# Patient Record
Sex: MALE | Race: BLACK OR AFRICAN AMERICAN | Employment: FULL TIME | URBAN - METROPOLITAN AREA
[De-identification: names, ages, dates, MRNs, and addresses within clinical notes are randomized per-mention and may not be internally consistent; named-entity substitution may affect disease eponyms.]

---

## 2017-04-29 ENCOUNTER — APPOINTMENT (OUTPATIENT)
Dept: GENERAL RADIOLOGY | Age: 37
End: 2017-04-29
Attending: EMERGENCY MEDICINE
Payer: COMMERCIAL

## 2017-04-29 ENCOUNTER — HOSPITAL ENCOUNTER (EMERGENCY)
Age: 37
Discharge: HOME | End: 2017-04-29
Attending: EMERGENCY MEDICINE
Payer: COMMERCIAL

## 2017-04-29 VITALS
BODY MASS INDEX: 29.8 KG/M2 | RESPIRATION RATE: 16 BRPM | WEIGHT: 220 LBS | SYSTOLIC BLOOD PRESSURE: 130 MMHG | TEMPERATURE: 99.2 F | HEART RATE: 71 BPM | DIASTOLIC BLOOD PRESSURE: 96 MMHG | OXYGEN SATURATION: 95 % | HEIGHT: 72 IN

## 2017-04-29 DIAGNOSIS — S93.402A SPRAIN OF LEFT ANKLE, UNSPECIFIED LIGAMENT, INITIAL ENCOUNTER: Primary | ICD-10-CM

## 2017-04-29 PROCEDURE — 74011250636 HC RX REV CODE- 250/636: Performed by: EMERGENCY MEDICINE

## 2017-04-29 PROCEDURE — L4350 ANKLE CONTROL ORTHO PRE OTS: HCPCS

## 2017-04-29 PROCEDURE — 73610 X-RAY EXAM OF ANKLE: CPT

## 2017-04-29 PROCEDURE — 99283 EMERGENCY DEPT VISIT LOW MDM: CPT

## 2017-04-29 PROCEDURE — 73590 X-RAY EXAM OF LOWER LEG: CPT

## 2017-04-29 PROCEDURE — 96372 THER/PROPH/DIAG INJ SC/IM: CPT

## 2017-04-29 RX ORDER — KETOROLAC TROMETHAMINE 30 MG/ML
60 INJECTION, SOLUTION INTRAMUSCULAR; INTRAVENOUS
Status: COMPLETED | OUTPATIENT
Start: 2017-04-29 | End: 2017-04-29

## 2017-04-29 RX ORDER — KETOROLAC TROMETHAMINE 10 MG/1
10 TABLET, FILM COATED ORAL EVERY 8 HOURS
Qty: 15 TAB | Refills: 0 | Status: SHIPPED | OUTPATIENT
Start: 2017-04-29 | End: 2017-05-04

## 2017-04-29 RX ADMIN — KETOROLAC TROMETHAMINE 60 MG: 30 INJECTION, SOLUTION INTRAMUSCULAR at 03:57

## 2017-04-29 NOTE — ED TRIAGE NOTES
Pt injured left lower leg sliding while playing baseball last night. Pain becoming worse. Pt reports he took aleve prior to coming in.

## 2017-04-29 NOTE — ED NOTES
Pt was discharged in good and improved condition. The patient's diagnosis, condition and treatment were explained to patient and aftercare instructions were given. The patient verbalized good understanding. Patient armband removed and both labels and armband were placed in shred bin. Patient left ER ambulatory with steady gait in no acute distress. 1 prescriptions provided. Patient reports pain is currently 4 on numeric scale. Patient provided education about pain medication including dosage and side effects. Patient verbalized understanding. Ride spouse at bedside to provide transportation home.

## 2017-04-29 NOTE — ED PROVIDER NOTES
HPI Comments: 3:48 AM   Simba Duran is a 29 y.o. male presenting to the ED C/O gradually worsening  left lower leg onset 9 hours ago when pt slid when playing softball. Pt states he took Aleve PTA. Pt is unable to ambulate due to pain. Pt denies ankle swelling  and any other Sx or complaints. Patient is a 29 y.o. male presenting with leg pain. The history is provided by the patient. No  was used. Leg Pain    This is a new problem. The current episode started 6 to 12 hours ago (9 hours ago). The problem occurs constantly. The problem has been gradually worsening. The pain is present in the left lower leg. The pain is at a severity of 10/10. The symptoms are aggravated by contact. Written by JOANNA Granados, as dictated by Boston Pearl MD    History reviewed. No pertinent past medical history. History reviewed. No pertinent surgical history. History reviewed. No pertinent family history. Social History     Social History    Marital status:      Spouse name: N/A    Number of children: N/A    Years of education: N/A     Occupational History    Not on file. Social History Main Topics    Smoking status: Not on file    Smokeless tobacco: Not on file    Alcohol use Not on file    Drug use: Not on file    Sexual activity: Not on file     Other Topics Concern    Not on file     Social History Narrative    No narrative on file         ALLERGIES: Review of patient's allergies indicates no known allergies. Review of Systems   Musculoskeletal: Positive for myalgias (Left lower leg). Negative for joint swelling. All other systems reviewed and are negative. Vitals:    04/29/17 0344   BP: (!) 130/96   Pulse: 71   Resp: 16   Temp: 99.2 °F (37.3 °C)   SpO2: 95%   Weight: 99.8 kg (220 lb)   Height: 6' (1.829 m)            Physical Exam   Constitutional: He is oriented to person, place, and time. He appears well-developed and well-nourished. HENT:   Head: Normocephalic and atraumatic. Eyes: Pupils are equal, round, and reactive to light. Neck: Neck supple. Cardiovascular: Normal rate, regular rhythm, S1 normal, S2 normal and normal heart sounds. Pulmonary/Chest: Breath sounds normal. No respiratory distress. He has no wheezes. He has no rales. He exhibits no tenderness. Abdominal: Soft. He exhibits no distension and no mass. There is no tenderness. There is no guarding. Musculoskeletal: Normal range of motion. He exhibits no edema. Left knee: He exhibits normal range of motion (Full ROM) and no swelling. Tenderness (Minimal tenderness) found. Left ankle: Tenderness (Diffusely tender). Left lower leg: He exhibits tenderness (Tenderness to left calf). Increased pain with ROM of left ankle    Neurological: He is alert and oriented to person, place, and time. No cranial nerve deficit. Left lower extremity neurovascularly intact   Skin: No rash noted. Psychiatric: He has a normal mood and affect. His behavior is normal. Thought content normal.   Nursing note and vitals reviewed. RESULTS:      PULSE OXIMETRY NOTE:  Pulse-ox is 95% on RA  Interpretation: Normal       X-RAY FINDINGS:  4:38 AM  Left tib/fib x-ray shows no acute process  Pending review by Radiologist  Recorded by Minus Michele, ED Scribe, as dictated by Homer Stevens MD   X-RAY FINDINGS:  4:38 AM  Left ankle x-ray shows no acute process  Pending review by Radiologist  Recorded by Minus Michele, ED Scribe, as dictated by Homer Stevens MD     XR ANKLE LT MIN 3 V    (Results Pending)   XR TIB/FIB LT    (Results Pending)        Labs Reviewed - No data to display    No results found for this or any previous visit (from the past 12 hour(s)).     MDM  Number of Diagnoses or Management Options  Sprain of left ankle, unspecified ligament, initial encounter:      Amount and/or Complexity of Data Reviewed  Tests in the radiology section of CPT®: reviewed and ordered (Left ankle x-ray, Left tib/fib x-ray)  Independent visualization of images, tracings, or specimens: yes (Left ankle x-ray, Left tib/fib x-ray)      ED Course     Medications   ketorolac tromethamine (TORADOL) 60 mg/2 mL injection 60 mg (60 mg IntraMUSCular Given 4/29/17 0357)      Procedures  PROGRESS NOTE:  3:48 AM  Initial assessment performed. Written by Jd Dorsey ED Scribe, as dictated by Boston Pearl MD     DISCHARGE NOTE:  4:47 AM   Rhonda Gleason  results have been reviewed with him. He has been counseled regarding his diagnosis, treatment, and plan. He verbally conveys understanding and agreement of the signs, symptoms, diagnosis, treatment and prognosis and additionally agrees to follow up as discussed. He also agrees with the care-plan and conveys that all of his questions have been answered. I have also provided discharge instructions for him that include: educational information regarding their diagnosis and treatment, and list of reasons why they would want to return to the ED prior to their follow-up appointment, should his condition change. The patient and/or family has been provided with education for proper Emergency Department utilization. CLINICAL IMPRESSION:    1. Sprain of left ankle, unspecified ligament, initial encounter        PLAN: DISCHARGE HOME    Follow-up Information     Follow up With Details Comments Richard Palma MD Schedule an appointment as soon as possible for a visit in 2 days  [phone] [address] EMERGENCY DEPT Go to As needed, If symptoms worsen [address] [place] Farley  [phone]          Current Discharge Medication List      START taking these medications    Details   ketorolac (TORADOL) 10 mg tablet Take 1 Tab by mouth every eight (8) hours for 5 days.   Qty: 15 Tab, Refills: 0             ATTESTATIONS:  This note is prepared by Ana Rosa Sanchez Keyur Burr, acting as Scribe for [employer], MD .    [employer], MD : The scribe's documentation has been prepared under my direction and personally reviewed by me in its entirety. I confirm that the note above accurately reflects all work, treatment, procedures, and medical decision making performed by me.

## 2017-04-29 NOTE — DISCHARGE INSTRUCTIONS
Ankle Sprain: Care Instructions  Your Care Instructions    An ankle sprain can happen when you twist your ankle. The ligaments that support the ankle can get stretched and torn. Often the ankle is swollen and painful. Ankle sprains may take from several weeks to several months to heal. Usually, the more pain and swelling you have, the more severe your ankle sprain is and the longer it will take to heal. You can heal faster and regain strength in your ankle with good home treatment. It is very important to give your ankle time to heal completely, so that you do not easily hurt your ankle again. Follow-up care is a key part of your treatment and safety. Be sure to make and go to all appointments, and call your doctor if you are having problems. It's also a good idea to know your test results and keep a list of the medicines you take. How can you care for yourself at home? · Prop up your foot on pillows as much as possible for the next 3 days. Try to keep your ankle above the level of your heart. This will help reduce the swelling. · Follow your doctor's directions for wearing a splint or elastic bandage. Wrapping the ankle may help reduce or prevent swelling. · Your doctor may give you a splint, a brace, an air stirrup, or another form of ankle support to protect your ankle until it is healed. Wear it as directed while your ankle is healing. Do not remove it unless your doctor tells you to. After your ankle has healed, ask your doctor whether you should wear the brace when you exercise. · Put ice or cold packs on your injured ankle for 10 to 20 minutes at a time. Try to do this every 1 to 2 hours for the next 3 days (when you are awake) or until the swelling goes down. Put a thin cloth between the ice and your skin. · You may need to use crutches until you can walk without pain. If you do use crutches, try to bear some weight on your injured ankle if you can do so without pain.  This helps the ankle heal.  · Take pain medicines exactly as directed. ¨ If the doctor gave you a prescription medicine for pain, take it as prescribed. ¨ If you are not taking a prescription pain medicine, ask your doctor if you can take an over-the-counter medicine. · If you have been given ankle exercises to do at home, do them exactly as instructed. These can promote healing and help prevent lasting weakness. When should you call for help? Call your doctor now or seek immediate medical care if:  · Your pain is getting worse. · Your swelling is getting worse. · Your splint feels too tight or you are unable to loosen it. Watch closely for changes in your health, and be sure to contact your doctor if:  · You are not getting better after 1 week. Where can you learn more? Go to http://katey-devan.info/. Enter C830 in the search box to learn more about \"Ankle Sprain: Care Instructions. \"  Current as of: May 23, 2016  Content Version: 11.2  © 3818-1979 [employer]. Care instructions adapted under license by [employer] (which disclaims liability or warranty for this information). If you have questions about a medical condition or this instruction, always ask your healthcare professional. [place] [address] any warranty or liability for your use of this information.

## 2017-04-29 NOTE — ED NOTES
Pt with complaints of left leg and ankle pain. Pt states he was playing baseball last night when he slide into base and injured his left leg and ankle. There is no obvious deformity noted but moderate swelling noted to left ankle.